# Patient Record
Sex: FEMALE | Race: WHITE | HISPANIC OR LATINO | Employment: UNEMPLOYED | URBAN - METROPOLITAN AREA
[De-identification: names, ages, dates, MRNs, and addresses within clinical notes are randomized per-mention and may not be internally consistent; named-entity substitution may affect disease eponyms.]

---

## 2024-07-17 ENCOUNTER — OFFICE VISIT (OUTPATIENT)
Dept: FAMILY MEDICINE CLINIC | Facility: CLINIC | Age: 59
End: 2024-07-17
Payer: COMMERCIAL

## 2024-07-17 VITALS
HEART RATE: 80 BPM | TEMPERATURE: 97.8 F | DIASTOLIC BLOOD PRESSURE: 65 MMHG | WEIGHT: 116.4 LBS | SYSTOLIC BLOOD PRESSURE: 117 MMHG | OXYGEN SATURATION: 99 %

## 2024-07-17 DIAGNOSIS — Z11.59 NEED FOR HEPATITIS C SCREENING TEST: ICD-10-CM

## 2024-07-17 DIAGNOSIS — Z01.419 ENCOUNTER FOR ANNUAL ROUTINE GYNECOLOGICAL EXAMINATION: ICD-10-CM

## 2024-07-17 DIAGNOSIS — E78.5 HYPERLIPIDEMIA, UNSPECIFIED HYPERLIPIDEMIA TYPE: ICD-10-CM

## 2024-07-17 DIAGNOSIS — K21.9 GASTROESOPHAGEAL REFLUX DISEASE WITHOUT ESOPHAGITIS: ICD-10-CM

## 2024-07-17 DIAGNOSIS — Z12.31 SCREENING MAMMOGRAM FOR BREAST CANCER: ICD-10-CM

## 2024-07-17 DIAGNOSIS — R73.09 HIGH GLUCOSE: Primary | ICD-10-CM

## 2024-07-17 DIAGNOSIS — K80.20 CALCULUS OF GALLBLADDER WITHOUT CHOLECYSTITIS WITHOUT OBSTRUCTION: ICD-10-CM

## 2024-07-17 DIAGNOSIS — Z12.11 COLON CANCER SCREENING: ICD-10-CM

## 2024-07-17 LAB
ALBUMIN SERPL-MCNC: 4.6 G/DL (ref 3.5–5.2)
ALBUMIN/GLOB SERPL: 1.4 G/DL
ALP SERPL-CCNC: 103 U/L (ref 39–117)
ALT SERPL W P-5'-P-CCNC: 26 U/L (ref 1–33)
ANION GAP SERPL CALCULATED.3IONS-SCNC: 10 MMOL/L (ref 5–15)
AST SERPL-CCNC: 28 U/L (ref 1–32)
BASOPHILS # BLD AUTO: 0.04 10*3/MM3 (ref 0–0.2)
BASOPHILS NFR BLD AUTO: 0.6 % (ref 0–1.5)
BILIRUB SERPL-MCNC: 0.7 MG/DL (ref 0–1.2)
BUN SERPL-MCNC: 16 MG/DL (ref 6–20)
BUN/CREAT SERPL: 25.4 (ref 7–25)
CALCIUM SPEC-SCNC: 10 MG/DL (ref 8.6–10.5)
CHLORIDE SERPL-SCNC: 100 MMOL/L (ref 98–107)
CHOLEST SERPL-MCNC: 229 MG/DL (ref 0–200)
CO2 SERPL-SCNC: 29 MMOL/L (ref 22–29)
CREAT SERPL-MCNC: 0.63 MG/DL (ref 0.57–1)
DEPRECATED RDW RBC AUTO: 43.3 FL (ref 37–54)
EGFRCR SERPLBLD CKD-EPI 2021: 104.3 ML/MIN/1.73
EOSINOPHIL # BLD AUTO: 0.18 10*3/MM3 (ref 0–0.4)
EOSINOPHIL NFR BLD AUTO: 2.7 % (ref 0.3–6.2)
ERYTHROCYTE [DISTWIDTH] IN BLOOD BY AUTOMATED COUNT: 12.3 % (ref 12.3–15.4)
GLOBULIN UR ELPH-MCNC: 3.3 GM/DL
GLUCOSE SERPL-MCNC: 128 MG/DL (ref 65–99)
HBA1C MFR BLD: 8 % (ref 4.8–5.6)
HCT VFR BLD AUTO: 40.3 % (ref 34–46.6)
HCV AB SER QL: NORMAL
HDLC SERPL-MCNC: 41 MG/DL (ref 40–60)
HGB BLD-MCNC: 13.6 G/DL (ref 12–15.9)
IMM GRANULOCYTES # BLD AUTO: 0.01 10*3/MM3 (ref 0–0.05)
IMM GRANULOCYTES NFR BLD AUTO: 0.2 % (ref 0–0.5)
LDLC SERPL CALC-MCNC: 142 MG/DL (ref 0–100)
LDLC/HDLC SERPL: 3.34 {RATIO}
LYMPHOCYTES # BLD AUTO: 3.12 10*3/MM3 (ref 0.7–3.1)
LYMPHOCYTES NFR BLD AUTO: 47.1 % (ref 19.6–45.3)
MCH RBC QN AUTO: 32.5 PG (ref 26.6–33)
MCHC RBC AUTO-ENTMCNC: 33.7 G/DL (ref 31.5–35.7)
MCV RBC AUTO: 96.2 FL (ref 79–97)
MONOCYTES # BLD AUTO: 0.53 10*3/MM3 (ref 0.1–0.9)
MONOCYTES NFR BLD AUTO: 8 % (ref 5–12)
NEUTROPHILS NFR BLD AUTO: 2.75 10*3/MM3 (ref 1.7–7)
NEUTROPHILS NFR BLD AUTO: 41.4 % (ref 42.7–76)
NRBC BLD AUTO-RTO: 0 /100 WBC (ref 0–0.2)
PLATELET # BLD AUTO: 211 10*3/MM3 (ref 140–450)
PMV BLD AUTO: 10.4 FL (ref 6–12)
POTASSIUM SERPL-SCNC: 4.6 MMOL/L (ref 3.5–5.2)
PROT SERPL-MCNC: 7.9 G/DL (ref 6–8.5)
RBC # BLD AUTO: 4.19 10*6/MM3 (ref 3.77–5.28)
SODIUM SERPL-SCNC: 139 MMOL/L (ref 136–145)
TRIGL SERPL-MCNC: 256 MG/DL (ref 0–150)
TSH SERPL DL<=0.05 MIU/L-ACNC: 1.15 UIU/ML (ref 0.27–4.2)
VLDLC SERPL-MCNC: 46 MG/DL (ref 5–40)
WBC NRBC COR # BLD AUTO: 6.63 10*3/MM3 (ref 3.4–10.8)

## 2024-07-17 PROCEDURE — 80050 GENERAL HEALTH PANEL: CPT | Performed by: STUDENT IN AN ORGANIZED HEALTH CARE EDUCATION/TRAINING PROGRAM

## 2024-07-17 PROCEDURE — 80061 LIPID PANEL: CPT | Performed by: STUDENT IN AN ORGANIZED HEALTH CARE EDUCATION/TRAINING PROGRAM

## 2024-07-17 PROCEDURE — 86803 HEPATITIS C AB TEST: CPT | Performed by: STUDENT IN AN ORGANIZED HEALTH CARE EDUCATION/TRAINING PROGRAM

## 2024-07-17 PROCEDURE — 83036 HEMOGLOBIN GLYCOSYLATED A1C: CPT | Performed by: STUDENT IN AN ORGANIZED HEALTH CARE EDUCATION/TRAINING PROGRAM

## 2024-07-17 PROCEDURE — 99204 OFFICE O/P NEW MOD 45 MIN: CPT | Performed by: STUDENT IN AN ORGANIZED HEALTH CARE EDUCATION/TRAINING PROGRAM

## 2024-07-17 RX ORDER — PANTOPRAZOLE SODIUM 20 MG/1
20 TABLET, DELAYED RELEASE ORAL DAILY
Qty: 90 TABLET | Refills: 2 | Status: SHIPPED | OUTPATIENT
Start: 2024-07-17

## 2024-07-17 RX ORDER — ESCITALOPRAM OXALATE 5 MG/1
5 TABLET ORAL DAILY
Qty: 90 TABLET | Refills: 3 | Status: SHIPPED | OUTPATIENT
Start: 2024-07-17

## 2024-07-17 NOTE — PROGRESS NOTES
Chief Complaint  Establish Care (New pt ), Diabetes (Was told she had diabetes ), and Labs Only    Subjective      Lashay Carney is a 58 y.o. female who presents to [facility]     New patient:      Pt has diabetes. New to our practice. Will obtain blood work and reeval.     Depression - started on lexapro.     GERD - protonix.     HM  Colonoscopy.  Mammogram ordered.       Objective   Vital Signs:   Vitals:    07/17/24 1328   BP: 117/65   Pulse: 80   Temp: 97.8 °F (36.6 °C)   TempSrc: Temporal   SpO2: 99%   Weight: 52.8 kg (116 lb 6.4 oz)     There is no height or weight on file to calculate BMI.    Wt Readings from Last 3 Encounters:   07/17/24 52.8 kg (116 lb 6.4 oz)     BP Readings from Last 3 Encounters:   07/17/24 117/65       Health Maintenance   Topic Date Due    MAMMOGRAM  Never done    LIPID PANEL  Never done    COLORECTAL CANCER SCREENING  Never done    ZOSTER VACCINE (1 of 2) Never done    HEPATITIS C SCREENING  Never done    ANNUAL PHYSICAL  Never done    PAP SMEAR  Never done    TDAP/TD VACCINES (1 - Tdap) 08/15/2024 (Originally 10/1/1986)    COVID-19 Vaccine (1 - 2023-24 season) 10/06/2024 (Originally 9/1/2023)    INFLUENZA VACCINE  08/01/2024    Pneumococcal Vaccine 0-64  Aged Out       Physical Exam  Vitals reviewed.   HENT:      Head: Normocephalic.      Mouth/Throat:      Mouth: Mucous membranes are moist.   Eyes:      Pupils: Pupils are equal, round, and reactive to light.   Cardiovascular:      Rate and Rhythm: Normal rate.   Abdominal:      General: Abdomen is flat.   Musculoskeletal:         General: Normal range of motion.      Cervical back: Normal range of motion.   Skin:     General: Skin is warm.      Capillary Refill: Capillary refill takes less than 2 seconds.   Neurological:      Mental Status: She is alert.          Assessment & Plan  High glucose  Diabetes  Get A1c and reeval   Hyperlipidemia, unspecified hyperlipidemia type   Lipid abnormalities are  stable    Plan:  Continue same medication/s without change.   and obtain blood work. Reeval with labs.      Discussed medication dosage, use, side effects, and goals of treatment in detail.    Counseled patient on lifestyle modifications to help control hyperlipidemia.     Patient Treatment Goals:   LDL goal is less than 70    Followup in 6 months.  Screening mammogram for breast cancer  Mammogram ordered.  Colon cancer screening  GI referral   Encounter for annual routine gynecological examination  Ref to gyn  Need for hepatitis C screening test  Hep c screening  Calculus of gallbladder without cholecystitis without obstruction    Gastroesophageal reflux disease without esophagitis      Orders Placed This Encounter   Procedures    Mammo Screening Digital Tomosynthesis Bilateral With CAD    US Gallbladder    TSH    Hemoglobin A1c    Comprehensive Metabolic Panel    Lipid Panel    MicroAlbumin, Urine, Random - Urine, Clean Catch    Hepatitis C antibody    H. Pylori Antigen, Stool - Stool, Per Rectum    Ambulatory Referral For Screening Colonoscopy    Ambulatory Referral to Gynecology    CBC & Differential     New Medications Ordered This Visit   Medications    pantoprazole (Protonix) 20 MG EC tablet     Sig: Take 1 tablet by mouth Daily.     Dispense:  90 tablet     Refill:  2    metFORMIN (GLUCOPHAGE) 850 MG tablet     Sig: Take 1 tablet by mouth 2 (Two) Times a Day With Meals.     Dispense:  90 tablet     Refill:  3    escitalopram (Lexapro) 5 MG tablet     Sig: Take 1 tablet by mouth Daily.     Dispense:  90 tablet     Refill:  3          BMI cannot be calculated due to outdated height or weight values.  Please input a current height/weight in Vitals and re-renter BMIFOLLOWUP in Note to pull in correct documentation based on BMI range.       I spent 25 minutes caring for Lashay on this date of service. This time includes time spent by me in the following activities:preparing for the visit, reviewing tests,  obtaining and/or reviewing a separately obtained history, performing a medically appropriate examination and/or evaluation , counseling and educating the patient/family/caregiver, ordering medications, tests, or procedures, referring and communicating with other health care professionals , documenting information in the medical record, independently interpreting results and communicating that information with the patient/family/caregiver, and care coordination  FOLLOW UP  Return in about 3 months (around 10/17/2024).  Patient was given instructions and counseling regarding her condition or for health maintenance advice. Please see specific information pulled into the AVS if appropriate.       Luis Suresh MD  07/17/24  14:13 EDT    CURRENT & DISCONTINUED MEDICATIONS  Current Outpatient Medications   Medication Instructions    escitalopram (LEXAPRO) 5 mg, Oral, Daily    metFORMIN (GLUCOPHAGE) 850 mg, Oral, 2 Times Daily With Meals    pantoprazole (PROTONIX) 20 mg, Oral, Daily       Medications Discontinued During This Encounter   Medication Reason    metFORMIN (GLUCOPHAGE) 850 MG tablet Reorder

## 2024-07-18 RX ORDER — ATORVASTATIN CALCIUM 10 MG/1
10 TABLET, FILM COATED ORAL DAILY
Qty: 90 TABLET | Refills: 3 | Status: SHIPPED | OUTPATIENT
Start: 2024-07-18

## 2024-07-19 PROCEDURE — 87338 HPYLORI STOOL AG IA: CPT | Performed by: STUDENT IN AN ORGANIZED HEALTH CARE EDUCATION/TRAINING PROGRAM

## 2024-08-07 ENCOUNTER — CLINICAL SUPPORT (OUTPATIENT)
Dept: GASTROENTEROLOGY | Facility: CLINIC | Age: 59
End: 2024-08-07
Payer: MEDICAID

## 2024-08-07 NOTE — PROGRESS NOTES
During the Direct Access call with  the patient She communicated that they are having other gastrointestinal symptoms, to include Abominal/Esophageal burning  that will require an office visit. The patient verbalized understanding, the call was stopped and the patient was scheduled for an office visit with SARAHI Alberto.

## 2024-08-08 ENCOUNTER — HOSPITAL ENCOUNTER (OUTPATIENT)
Dept: MAMMOGRAPHY | Facility: HOSPITAL | Age: 59
Discharge: HOME | End: 2024-08-08
Payer: MEDICAID

## 2024-08-08 ENCOUNTER — HOSPITAL ENCOUNTER (OUTPATIENT)
Dept: ULTRASOUND IMAGING | Facility: HOSPITAL | Age: 59
Discharge: HOME | End: 2024-08-08
Payer: MEDICAID

## 2024-08-08 DIAGNOSIS — Z12.31 SCREENING MAMMOGRAM FOR BREAST CANCER: ICD-10-CM

## 2024-08-08 DIAGNOSIS — K80.20 CALCULUS OF GALLBLADDER WITHOUT CHOLECYSTITIS WITHOUT OBSTRUCTION: ICD-10-CM

## 2024-08-08 PROCEDURE — 77063 BREAST TOMOSYNTHESIS BI: CPT

## 2024-08-08 PROCEDURE — 76705 ECHO EXAM OF ABDOMEN: CPT

## 2024-08-08 PROCEDURE — 77067 SCR MAMMO BI INCL CAD: CPT

## 2024-08-14 ENCOUNTER — OFFICE VISIT (OUTPATIENT)
Dept: SURGERY | Facility: CLINIC | Age: 59
End: 2024-08-14
Payer: MEDICAID

## 2024-08-14 VITALS
HEIGHT: 62 IN | BODY MASS INDEX: 21.12 KG/M2 | SYSTOLIC BLOOD PRESSURE: 119 MMHG | HEART RATE: 73 BPM | WEIGHT: 114.8 LBS | DIASTOLIC BLOOD PRESSURE: 74 MMHG

## 2024-08-14 DIAGNOSIS — K82.4 GALLBLADDER POLYP: Primary | ICD-10-CM

## 2024-08-14 NOTE — PROGRESS NOTES
Patient Name:  Lashay Carney  YOB: 1965  [id number]    Referring Provider: Luis Carcamo,*    Patient Care Team:  Luis Carcamo MD as PCP - General (Internal Medicine)      Chief Complaint  GALLBLADDER POLYP (RUQ PAIN.)    Subjective     Lashay Carney is a 58 y.o. female who presents to [facility]    History of Present Illness  58-year-old female who presents today as a new referral for a gallbladder polyp.  Patient was recently vacationing in [place] when she developed right upper quadrant abdominal pain.  Her daughter states that she underwent an ultrasound at that time was what was suggestive of some liver inflammation and a fatty liver.  Upon returning home she underwent evaluation again with an ultrasound which was suggestive of fatty liver and a 4 mm gallbladder polyp without cholelithiasis.  Patient states she has intermittent postprandial right upper quadrant abdominal pain particularly when eating fatty/greasy foods.  She denies any changes to her urinary or stool habits.  Her abdominal surgical history includes a tubal ligation.      History     Past Medical History:   Diagnosis Date    Acid reflux     Depression     Diabetes mellitus     High cholesterol        Past Surgical History:   Procedure Laterality Date    CYST REMOVAL  2018    VULVA    TUBAL ABDOMINAL LIGATION  1996       Family History   Problem Relation Age of Onset    Diabetes Mother     No Known Problems Father        Social History     Tobacco Use    Smoking status: Never    Smokeless tobacco: Never   Vaping Use    Vaping status: Never Used   Substance Use Topics    Alcohol use: Never    Drug use: Never       No Known Allergies    Prior to Admission medications    Medication Sig Start Date End Date Taking? Authorizing Provider   atorvastatin (LIPITOR) 10 MG tablet Take 1 tablet by mouth Daily. 7/18/24  Yes Luis Carcamo MD   escitalopram (Lexapro) 5 MG tablet Take  "1 tablet by mouth Daily. 7/17/24  Yes Luis Carcamo MD   metFORMIN (GLUCOPHAGE) 850 MG tablet Take 1 tablet by mouth 2 (Two) Times a Day With Meals. 7/17/24  Yes Luis Carcamo MD   pantoprazole (Protonix) 20 MG EC tablet Take 1 tablet by mouth Daily. 7/17/24  Yes Luis Carcamo MD   SITagliptin (JANUVIA) 50 MG tablet Take 1 tablet by mouth Daily. 7/18/24  Yes Luis Carcamo MD       Objective    Objective       BMI is within normal parameters. No other follow-up for BMI required.      Vital Signs:   /74 (BP Location: Right arm, Patient Position: Sitting, Cuff Size: Adult)   Pulse 73   Ht 157.5 cm (62\")   Wt 52.1 kg (114 lb 12.8 oz)   BMI 21.00 kg/m²       Physical Exam  Constitutional:       Appearance: Normal appearance.   HENT:      Head: Normocephalic and atraumatic.      Mouth/Throat:      Mouth: Mucous membranes are moist.      Pharynx: Oropharynx is clear.   Cardiovascular:      Rate and Rhythm: Normal rate and regular rhythm.   Pulmonary:      Effort: Pulmonary effort is normal. No respiratory distress.   Abdominal:      General: There is no distension.      Palpations: Abdomen is soft.      Tenderness: There is no abdominal tenderness.      Comments: No right upper quadrant tenderness, no Person sign   Musculoskeletal:         General: No swelling. Normal range of motion.      Cervical back: Normal range of motion and neck supple.   Skin:     General: Skin is warm and dry.   Neurological:      General: No focal deficit present.      Mental Status: She is alert and oriented to person, place, and time.   Psychiatric:         Mood and Affect: Mood normal.         Behavior: Behavior normal.                Assessment / Plan      Diagnoses and all orders for this visit:    1. Gallbladder polyp (Primary)    58-year-old female with right upper quadrant abdominal pain and gallbladder polyp.  Although the size of her gallbladder polyp does not warrant surgical excision, " the presence of this with her postprandial right upper quadrant pain does.  Recommended outpatient cholecystectomy, but patient would like to think about this with her family.  If she ultimately does not opt to pursue elective surgery would recommend follow-up ultrasound to assess for growth in the polyp.  Her daughter states she will call the office in the near future after her mother comes to a decision.    Follow Up   Return if symptoms worsen or fail to improve.      Patient was given instructions and counseling regarding her condition or for health maintenance advice. Please see specific information pulled into the AVS if appropriate.     Electronically signed by Eriberto Hart MD, 08/14/24, 2:58 PM EDT.

## 2024-08-16 ENCOUNTER — OFFICE VISIT (OUTPATIENT)
Dept: FAMILY MEDICINE CLINIC | Facility: CLINIC | Age: 59
End: 2024-08-16
Payer: MEDICAID

## 2024-08-16 VITALS
BODY MASS INDEX: 20.98 KG/M2 | HEART RATE: 75 BPM | SYSTOLIC BLOOD PRESSURE: 108 MMHG | DIASTOLIC BLOOD PRESSURE: 65 MMHG | OXYGEN SATURATION: 98 % | WEIGHT: 114 LBS | TEMPERATURE: 98.5 F | HEIGHT: 62 IN

## 2024-08-16 DIAGNOSIS — E11.65 TYPE 2 DIABETES MELLITUS WITH HYPERGLYCEMIA, WITHOUT LONG-TERM CURRENT USE OF INSULIN: Primary | ICD-10-CM

## 2024-08-16 PROCEDURE — 99214 OFFICE O/P EST MOD 30 MIN: CPT | Performed by: STUDENT IN AN ORGANIZED HEALTH CARE EDUCATION/TRAINING PROGRAM

## 2024-08-16 PROCEDURE — 3052F HG A1C>EQUAL 8.0%<EQUAL 9.0%: CPT | Performed by: STUDENT IN AN ORGANIZED HEALTH CARE EDUCATION/TRAINING PROGRAM

## 2024-08-16 RX ORDER — FLUOXETINE 10 MG/1
10 CAPSULE ORAL DAILY
Qty: 90 CAPSULE | Refills: 3 | Status: SHIPPED | OUTPATIENT
Start: 2024-08-16

## 2024-08-16 NOTE — PROGRESS NOTES
"Chief Complaint  Med Management (4 week follow up )    Subjective      Lashay Carney is a 58 y.o. female who presents to [facility]     Polyp gallbladder - follow with sx.     Diabetes - repeat A1c in 2 months.     HLD - cont statins.     Depression - meds switch follow in 6 weeks.    Objective   Vital Signs:   Vitals:    08/16/24 1423   BP: 108/65   BP Location: Left arm   Patient Position: Sitting   Cuff Size: Adult   Pulse: 75   Temp: 98.5 °F (36.9 °C)   TempSrc: Temporal   SpO2: 98%   Weight: 51.7 kg (114 lb)   Height: 157.5 cm (62.01\")     Body mass index is 20.85 kg/m².    Wt Readings from Last 3 Encounters:   08/16/24 51.7 kg (114 lb)   08/14/24 52.1 kg (114 lb 12.8 oz)   07/17/24 52.8 kg (116 lb 6.4 oz)     BP Readings from Last 3 Encounters:   08/16/24 108/65   08/14/24 119/74   07/17/24 117/65       Health Maintenance   Topic Date Due    COLORECTAL CANCER SCREENING  Never done    TDAP/TD VACCINES (1 - Tdap) Never done    ZOSTER VACCINE (1 of 2) Never done    ANNUAL PHYSICAL  Never done    PAP SMEAR  Never done    INFLUENZA VACCINE  08/01/2024    COVID-19 Vaccine (1 - 2023-24 season) 10/06/2024 (Originally 9/1/2023)    LIPID PANEL  07/17/2025    MAMMOGRAM  08/08/2026    HEPATITIS C SCREENING  Completed    Pneumococcal Vaccine 0-64  Aged Out       Physical Exam  Vitals reviewed.   HENT:      Head: Normocephalic.      Mouth/Throat:      Mouth: Mucous membranes are moist.   Eyes:      Pupils: Pupils are equal, round, and reactive to light.   Cardiovascular:      Rate and Rhythm: Normal rate.   Abdominal:      General: Abdomen is flat.   Musculoskeletal:         General: Normal range of motion.      Cervical back: Normal range of motion.   Skin:     General: Skin is warm.      Capillary Refill: Capillary refill takes less than 2 seconds.   Neurological:      Mental Status: She is alert.            Assessment & Plan  Type 2 diabetes mellitus with hyperglycemia, without long-term " current use of insulin  Diabetes is stable.   Continue current treatment regimen.  Diabetes will be reassessed in 6 months    Orders Placed This Encounter   Procedures    Hemoglobin A1c     New Medications Ordered This Visit   Medications    metFORMIN (GLUCOPHAGE) 850 MG tablet     Sig: Take 1 tablet by mouth 2 (Two) Times a Day With Meals.     Dispense:  180 tablet     Refill:  3    SITagliptin (JANUVIA) 100 MG tablet     Sig: Take 1 tablet by mouth Daily.     Dispense:  90 tablet     Refill:  3    FLUoxetine (PROzac) 10 MG capsule     Sig: Take 1 capsule by mouth Daily.     Dispense:  90 capsule     Refill:  3          BMI is within normal parameters. No other follow-up for BMI required.       I spent 30 minutes caring for Lashay on this date of service. This time includes time spent by me in the following activities:preparing for the visit, reviewing tests, obtaining and/or reviewing a separately obtained history, performing a medically appropriate examination and/or evaluation , counseling and educating the patient/family/caregiver, ordering medications, tests, or procedures, referring and communicating with other health care professionals , documenting information in the medical record, independently interpreting results and communicating that information with the patient/family/caregiver, and care coordination  FOLLOW UP  Return in about 2 months (around 10/16/2024).  Patient was given instructions and counseling regarding her condition or for health maintenance advice. Please see specific information pulled into the AVS if appropriate.       Luis Suresh MD  08/16/24  15:10 EDT    CURRENT & DISCONTINUED MEDICATIONS  Current Outpatient Medications   Medication Instructions    atorvastatin (LIPITOR) 10 mg, Oral, Daily    FLUoxetine (PROZAC) 10 mg, Oral, Daily    metFORMIN (GLUCOPHAGE) 850 mg, Oral, 2 Times Daily With Meals    pantoprazole (PROTONIX) 20 mg, Oral, Daily    SITagliptin (JANUVIA)  100 mg, Oral, Daily       Medications Discontinued During This Encounter   Medication Reason    metFORMIN (GLUCOPHAGE) 850 MG tablet     SITagliptin (JANUVIA) 50 MG tablet     escitalopram (Lexapro) 5 MG tablet *Therapy completed

## 2024-09-16 ENCOUNTER — OFFICE VISIT (OUTPATIENT)
Dept: GASTROENTEROLOGY | Facility: CLINIC | Age: 59
End: 2024-09-16
Payer: MEDICAID

## 2024-09-16 VITALS
SYSTOLIC BLOOD PRESSURE: 103 MMHG | DIASTOLIC BLOOD PRESSURE: 62 MMHG | WEIGHT: 114.8 LBS | BODY MASS INDEX: 20.99 KG/M2 | OXYGEN SATURATION: 99 % | HEART RATE: 76 BPM

## 2024-09-16 DIAGNOSIS — Z12.11 ENCOUNTER FOR SCREENING FOR MALIGNANT NEOPLASM OF COLON: Primary | ICD-10-CM

## 2024-09-16 DIAGNOSIS — K76.0 HEPATIC STEATOSIS: ICD-10-CM

## 2024-09-16 PROCEDURE — 99203 OFFICE O/P NEW LOW 30 MIN: CPT

## 2024-09-16 PROCEDURE — 1159F MED LIST DOCD IN RCRD: CPT

## 2024-09-16 PROCEDURE — 1160F RVW MEDS BY RX/DR IN RCRD: CPT

## 2024-09-16 RX ORDER — ONDANSETRON 4 MG/1
TABLET, FILM COATED ORAL
Qty: 2 TABLET | Refills: 0 | Status: SHIPPED | OUTPATIENT
Start: 2024-09-16

## 2024-09-30 NOTE — PROGRESS NOTES
"Well Woman Visit    CC: Scheduled annual well gyn visit  Chief Complaint   Patient presents with    Annual Exam       HPI:   59 y.o.   Social History     Substance and Sexual Activity   Sexual Activity Not on file       59-year-old female G5, P5 status post menopause since age 48 here for annual exam.  No bleeding.  No MATILDE.  Last Pap smear about 7 years ago.  Has appointment for colonoscopy.  Had a mammogram in 2024.  Pt has no complaints today.    PCP: does manage PMHx and preventative labs  History: PMHx, Meds, Allergies, PSHx, Social Hx, and POBHx all reviewed and updated.    PHYSICAL EXAM:  /75   Pulse 71   Ht 157.5 cm (62\")   Wt 50.3 kg (111 lb)   Breastfeeding No   BMI 20.30 kg/m²  Not found.  Chaperone present  General- NAD, alert and oriented, appropriate  Psych- Normal mood, good memory  Neck- No masses, no thyroid enlargement  CV- Regular rhythm, no murmurs  Resp- CTA to bases, no wheezes  Abdomen- Soft, non distended, non tender, no masses    Chaperone present during breast and/or pelvic exam if performed.  Breast left-  Bilaterally symmetrical, no masses, non tender, no nipple discharge, no axillary or supraclavicular nodes palpable.    Breast right- Bilaterally symmetrical, no masses, non tender, no nipple discharge, no axillary or supraclavicular nodes palpable.      External genitalia- Normal female, no lesions  Urethra/meatus- Normal, no masses, non tender  Bladder- Normal, no masses, non tender  Vagina- Normal, + atrophy, no lesions, no discharge.    Prolapse : Cystocele    Cvx- Normal, no lesions, no discharge, No cervical motion tenderness  Uterus- Normal size, shape & consistency.  Non tender, mobile.  Adnexa- No mass, non tender  Anus/Rectum/Perineum- Not performed    Lymphatic- No palpable neck, axillary, or groin nodes  Ext- No edema, no cyanosis    Skin- No lesions, no rashes, no acanthosis nigricans      ASSESSMENT and PLAN:    Diagnoses and all orders for this " visit:    1. Well woman exam with routine gynecological exam (Primary)  -     IgP, Aptima HPV        Preventative:  BREAST HEALTH- Monthly self breast exam importance and how to reviewed. MMG and/or MRI (prn) reviewed per society guidelines and her individual history. Screen: Already up to date  CERVICAL CANCER Screening- Reviewed current ASCCP guidelines for screening w and wo cotest HPV, age specific.  Screen: Updated today  COLON CANCER Screening- Reviewed current medical society guidelines and options.  Screen:  Already up to date  Follow up PCP/Specialist PMHx and/or Labs          She understands the importance of having any ordered tests to be performed in a timely fashion.  The risks of not performing them include, but are not limited to, advanced cancer stages, bone loss from osteoporosis and/or subsequent increase in morbidity and/or mortality.  She is encouraged to review her results online and/or contact or office if she has questions.     Follow Up:  Return in about 1 year (around 10/2/2025) for Annual physical.            Isabella Parks, DO  10/02/2024    [facility]  [address] [place] [address]  Dept: [phone]  Dept Fax: [phone]  Loc: [phone]  Loc Fax: [phone]

## 2024-10-02 ENCOUNTER — OFFICE VISIT (OUTPATIENT)
Dept: OBSTETRICS AND GYNECOLOGY | Facility: CLINIC | Age: 59
End: 2024-10-02
Payer: MEDICAID

## 2024-10-02 VITALS
HEIGHT: 62 IN | WEIGHT: 111 LBS | HEART RATE: 71 BPM | BODY MASS INDEX: 20.43 KG/M2 | DIASTOLIC BLOOD PRESSURE: 75 MMHG | SYSTOLIC BLOOD PRESSURE: 113 MMHG

## 2024-10-02 DIAGNOSIS — Z01.419 WELL WOMAN EXAM WITH ROUTINE GYNECOLOGICAL EXAM: Primary | ICD-10-CM

## 2024-10-02 PROCEDURE — G0123 SCREEN CERV/VAG THIN LAYER: HCPCS | Performed by: OBSTETRICS & GYNECOLOGY

## 2024-10-02 PROCEDURE — 87624 HPV HI-RISK TYP POOLED RSLT: CPT | Performed by: OBSTETRICS & GYNECOLOGY

## 2024-10-04 ENCOUNTER — PATIENT ROUNDING (BHMG ONLY) (OUTPATIENT)
Dept: OBSTETRICS AND GYNECOLOGY | Facility: CLINIC | Age: 59
End: 2024-10-04
Payer: MEDICAID

## 2024-10-04 NOTE — PROGRESS NOTES
A My-Chart message has been sent to the patient for PATIENT ROUNDING with [facility].

## 2024-10-07 LAB
CYTOLOGIST CVX/VAG CYTO: NORMAL
CYTOLOGY CVX/VAG DOC CYTO: NORMAL
CYTOLOGY CVX/VAG DOC THIN PREP: NORMAL
DX ICD CODE: NORMAL
HPV I/H RISK 4 DNA CVX QL PROBE+SIG AMP: NEGATIVE
Lab: NORMAL
OTHER STN SPEC: NORMAL
STAT OF ADQ CVX/VAG CYTO-IMP: NORMAL

## 2024-10-10 NOTE — PRE-PROCEDURE INSTRUCTIONS
Left message:  Reminded of arrival time at 1000        , Entrance C of the [facility]. Instructed to bring or have a  over the age of 18 set up to drive you home the day of procedure.    Instructed on clear liquid diet the day before, nothing red or purple. Call with any questions about the prep or if in need of the prep.  Reminded them not to eat or drink anything am of procedure unless its a sip of water with medications.  Hold diabetic meds am of procedure.

## 2024-10-18 ENCOUNTER — ANESTHESIA EVENT (OUTPATIENT)
Dept: GASTROENTEROLOGY | Facility: HOSPITAL | Age: 59
End: 2024-10-18
Payer: MEDICAID

## 2024-10-18 NOTE — ANESTHESIA PREPROCEDURE EVALUATION
Anesthesia Evaluation     Nursing notes reviewed   NPO Solid Status: > 8 hours  NPO Liquid Status: > 2 hours           Airway   Mallampati: II  TM distance: >3 FB  Neck ROM: full  No difficulty expected  Dental - normal exam     Pulmonary - normal exam    breath sounds clear to auscultation  Cardiovascular - normal exam  Exercise tolerance: good (4-7 METS)    Rhythm: regular  Rate: normal    (+) hyperlipidemia      Neuro/Psych  (+) psychiatric history Depression  GI/Hepatic/Renal/Endo    (+) GERD well controlled, diabetes mellitus type 2 well controlled    Musculoskeletal     Abdominal    Substance History      OB/GYN          Other        ROS/Med Hx Other: Screening    No EKG on file           Phys Exam Other: Patient speaks little English - daughter at bedside and speaks English well              Anesthesia Plan    ASA 2     general   total IV anesthesia  (Total IV Anesthesia    Patient understands anesthesia not responsible for dental damage.  )  intravenous induction     Anesthetic plan, risks, benefits, and alternatives have been provided, discussed and informed consent has been obtained with: patient and child.  Pre-procedure education provided  Plan discussed with CRNA.      CODE STATUS:          Patient had similar reactions to the other medication which is why we switched, I would have her take this a little longer and see If this subsides.

## 2024-10-21 ENCOUNTER — HOSPITAL ENCOUNTER (OUTPATIENT)
Facility: HOSPITAL | Age: 59
Setting detail: HOSPITAL OUTPATIENT SURGERY
Discharge: HOME | End: 2024-10-21
Attending: INTERNAL MEDICINE | Admitting: INTERNAL MEDICINE
Payer: MEDICAID

## 2024-10-21 ENCOUNTER — ANESTHESIA (OUTPATIENT)
Dept: GASTROENTEROLOGY | Facility: HOSPITAL | Age: 59
End: 2024-10-21
Payer: MEDICAID

## 2024-10-21 VITALS
SYSTOLIC BLOOD PRESSURE: 119 MMHG | OXYGEN SATURATION: 100 % | TEMPERATURE: 96.8 F | WEIGHT: 111.99 LBS | BODY MASS INDEX: 20.48 KG/M2 | DIASTOLIC BLOOD PRESSURE: 77 MMHG | HEART RATE: 63 BPM | RESPIRATION RATE: 11 BRPM

## 2024-10-21 LAB — GLUCOSE BLDC GLUCOMTR-MCNC: 119 MG/DL (ref 70–99)

## 2024-10-21 PROCEDURE — 25810000003 LACTATED RINGERS PER 1000 ML: Performed by: NURSE ANESTHETIST, CERTIFIED REGISTERED

## 2024-10-21 PROCEDURE — 25010000002 PROPOFOL 10 MG/ML EMULSION

## 2024-10-21 PROCEDURE — 82948 REAGENT STRIP/BLOOD GLUCOSE: CPT | Performed by: NURSE ANESTHETIST, CERTIFIED REGISTERED

## 2024-10-21 PROCEDURE — 63710000001 ONDANSETRON ODT 4 MG TABLET DISPERSIBLE: Performed by: NURSE ANESTHETIST, CERTIFIED REGISTERED

## 2024-10-21 PROCEDURE — 25010000002 LIDOCAINE PF 2% 2 % SOLUTION

## 2024-10-21 PROCEDURE — 45378 DIAGNOSTIC COLONOSCOPY: CPT | Performed by: INTERNAL MEDICINE

## 2024-10-21 RX ORDER — ONDANSETRON 4 MG/1
TABLET, ORALLY DISINTEGRATING ORAL
Status: DISCONTINUED
Start: 2024-10-21 | End: 2024-10-21 | Stop reason: HOSPADM

## 2024-10-21 RX ORDER — ONDANSETRON 4 MG/1
4 TABLET, ORALLY DISINTEGRATING ORAL ONCE
Status: COMPLETED | OUTPATIENT
Start: 2024-10-21 | End: 2024-10-21

## 2024-10-21 RX ORDER — PROPOFOL 10 MG/ML
VIAL (ML) INTRAVENOUS AS NEEDED
Status: DISCONTINUED | OUTPATIENT
Start: 2024-10-21 | End: 2024-10-21 | Stop reason: SURG

## 2024-10-21 RX ORDER — SODIUM CHLORIDE, SODIUM LACTATE, POTASSIUM CHLORIDE, CALCIUM CHLORIDE 600; 310; 30; 20 MG/100ML; MG/100ML; MG/100ML; MG/100ML
30 INJECTION, SOLUTION INTRAVENOUS CONTINUOUS
Status: DISCONTINUED | OUTPATIENT
Start: 2024-10-21 | End: 2024-10-21 | Stop reason: HOSPADM

## 2024-10-21 RX ORDER — LIDOCAINE HYDROCHLORIDE 20 MG/ML
INJECTION, SOLUTION EPIDURAL; INFILTRATION; INTRACAUDAL; PERINEURAL AS NEEDED
Status: DISCONTINUED | OUTPATIENT
Start: 2024-10-21 | End: 2024-10-21 | Stop reason: SURG

## 2024-10-21 RX ADMIN — SODIUM CHLORIDE, POTASSIUM CHLORIDE, SODIUM LACTATE AND CALCIUM CHLORIDE 30 ML/HR: 600; 310; 30; 20 INJECTION, SOLUTION INTRAVENOUS at 13:03

## 2024-10-21 RX ADMIN — LIDOCAINE HYDROCHLORIDE 50 MG: 20 INJECTION, SOLUTION EPIDURAL; INFILTRATION; INTRACAUDAL; PERINEURAL at 14:44

## 2024-10-21 RX ADMIN — PROPOFOL 150 MCG/KG/MIN: 10 INJECTION, EMULSION INTRAVENOUS at 14:46

## 2024-10-21 RX ADMIN — ONDANSETRON 4 MG: 4 TABLET, ORALLY DISINTEGRATING ORAL at 15:40

## 2024-10-21 RX ADMIN — PROPOFOL 50 MG: 10 INJECTION, EMULSION INTRAVENOUS at 14:44

## 2024-10-21 NOTE — H&P
Pre Procedure History & Physical    Chief Complaint: Colonoscopy    HPI: Lashay Carney is a 58 y.o. female had been seen by GI nurse practitioner Ana Perez on 9/16/2024 for a gastroenterology evaluation of Colon cancer screening.       Stanley explains she has struggled with heartburn starting 3-4 months ago. She explained that her pcp started her on protonix and symptoms have now improved. Denies nausea or vomiting. Denies dysphagia. Denies unintentional weight loss.      She reports having 2 BM a day. Stool described as formed, soft, and brown in color. Denies blood, mucus, or black stool. No reports of abdominal pain.     Patient denies family history of colon cancer or of colon polyps.  No previous history of EGD/Colonoscopy.    Past Medical History:   Past Medical History:   Diagnosis Date    Acid reflux     Depression     Diabetes mellitus     High cholesterol        Past Surgical History:  Past Surgical History:   Procedure Laterality Date    CYST REMOVAL  2018    VULVA    TUBAL ABDOMINAL LIGATION  1996       Family History:  Family History   Problem Relation Age of Onset    No Known Problems Father     Diabetes Mother     Colon cancer Neg Hx     Ovarian cancer Neg Hx     Uterine cancer Neg Hx     Breast cancer Neg Hx     Deep vein thrombosis Neg Hx     Pulmonary embolism Neg Hx        Social History:   reports that she has never smoked. She has never used smokeless tobacco. She reports that she does not drink alcohol and does not use drugs.    Medications:   Medications Prior to Admission   Medication Sig Dispense Refill Last Dose/Taking    atorvastatin (LIPITOR) 10 MG tablet Take 1 tablet by mouth Daily. 90 tablet 3 10/20/2024    FLUoxetine (PROzac) 10 MG capsule Take 1 capsule by mouth Daily. 90 capsule 3 10/20/2024    metFORMIN (GLUCOPHAGE) 850 MG tablet Take 1 tablet by mouth 2 (Two) Times a Day With Meals. 180 tablet 3 10/20/2024    ondansetron (Zofran) 4 MG tablet 1 po 1 hour before prep, may  repeat in 4-6 hrs as needed for n/v 2 tablet 0 10/20/2024    pantoprazole (Protonix) 20 MG EC tablet Take 1 tablet by mouth Daily. 90 tablet 2 10/20/2024    SITagliptin (JANUVIA) 100 MG tablet Take 1 tablet by mouth Daily. 90 tablet 3 10/20/2024       Allergies:  Patient has no known allergies.      Objective     Blood pressure 130/76, pulse 67, temperature 97.3 °F (36.3 °C), temperature source Temporal, resp. rate 18, weight 50.8 kg (111 lb 15.9 oz), SpO2 100%, not currently breastfeeding.    Physical Exam   Constitutional: Pt is oriented to person, place, and time and well-developed, well-nourished, and in no distress.   Mouth/Throat: Oropharynx is clear and moist.   Neck: Normal range of motion.   Cardiovascular: Normal rate, regular rhythm and normal heart sounds.    Pulmonary/Chest: Effort normal and breath sounds normal.   Abdominal: Soft. Nontender  Skin: Skin is warm and dry.   Psychiatric: Mood, memory, affect and judgment normal.     Assessment & Plan     Diagnosis:    Average risk screening colonoscopy    Anticipated Surgical Procedure:    Colonoscopy    The risks, benefits, and alternatives of this procedure have been discussed with the patient or the responsible party- the patient understands and agrees to proceed.        Electronically signed by Param Alves MD, 10/21/24, 2:31 PM EDT.

## 2024-10-21 NOTE — ANESTHESIA POSTPROCEDURE EVALUATION
Patient: Lashay Carney    Procedure Summary       Date: 10/21/24 Room / Location: [facility] ENDOSCOPY 5 / [facility] ENDOSCOPY    Anesthesia Start: 1442 Anesthesia Stop: 1510    Procedure: COLONOSCOPY Diagnosis:       Encounter for screening for malignant neoplasm of colon      (Encounter for screening for malignant neoplasm of colon [Z12.11])    Surgeons: Param Alves MD Provider: Radha Khalil CRNA    Anesthesia Type: general ASA Status: 2            Anesthesia Type: general    Vitals  Vitals Value Taken Time   /77 10/21/24 1523   Temp 36 °C (96.8 °F) 10/21/24 1523   Pulse 62 10/21/24 1525   Resp 11 10/21/24 1518   SpO2 100 % 10/21/24 1525   Vitals shown include unfiled device data.        Post Anesthesia Care and Evaluation    Patient location during evaluation: bedside  Patient participation: complete - patient participated  Level of consciousness: awake  Pain management: adequate    Airway patency: patent  Anesthetic complications: No anesthetic complications  PONV Status: controlled  Cardiovascular status: acceptable and stable  Respiratory status: acceptable

## 2024-10-22 ENCOUNTER — TELEPHONE (OUTPATIENT)
Age: 59
End: 2024-10-22
Payer: MEDICAID

## 2024-11-11 ENCOUNTER — TELEPHONE (OUTPATIENT)
Dept: GASTROENTEROLOGY | Facility: CLINIC | Age: 59
End: 2024-11-11
Payer: MEDICAID

## (undated) DEVICE — Device: Brand: DEFENDO AIR/WATER/SUCTION AND BIOPSY VALVE

## (undated) DEVICE — SOLIDIFIER LIQLOC PLS 1500CC BT

## (undated) DEVICE — DISPOSABLE DISTAL ATTACHMENT: Brand: DISPOSABLE DISTAL ATTACHMENT

## (undated) DEVICE — SOL IRRG H2O PL/BG 1000ML STRL

## (undated) DEVICE — Device